# Patient Record
Sex: FEMALE | Race: BLACK OR AFRICAN AMERICAN | NOT HISPANIC OR LATINO | URBAN - METROPOLITAN AREA
[De-identification: names, ages, dates, MRNs, and addresses within clinical notes are randomized per-mention and may not be internally consistent; named-entity substitution may affect disease eponyms.]

---

## 2020-08-27 PROBLEM — R63.5 ABNORMAL WEIGHT GAIN: Status: ACTIVE | Noted: 2020-08-27

## 2020-08-27 PROBLEM — N92.6 IRREGULAR MENSES: Status: ACTIVE | Noted: 2020-08-27

## 2020-09-29 PROBLEM — E88.819 INSULIN RESISTANCE: Status: ACTIVE | Noted: 2020-09-29

## 2022-05-11 PROBLEM — D50.8 IRON DEFICIENCY ANEMIA SECONDARY TO INADEQUATE DIETARY IRON INTAKE: Status: ACTIVE | Noted: 2022-05-11

## 2022-07-26 PROBLEM — R63.5 ABNORMAL WEIGHT GAIN: Status: RESOLVED | Noted: 2020-08-27 | Resolved: 2022-07-26

## 2022-12-23 ENCOUNTER — TELEPHONE (OUTPATIENT)
Dept: INTERNAL MEDICINE | Facility: CLINIC | Age: 22
End: 2022-12-23
Payer: COMMERCIAL

## 2022-12-23 NOTE — TELEPHONE ENCOUNTER
Attempted to contact patient to remind her of upcoming nutrition appointment. No answer.  Unable to leave voicemail.

## 2023-06-19 PROBLEM — E66.01 MORBID OBESITY: Status: ACTIVE | Noted: 2023-06-19

## 2024-11-12 PROBLEM — E28.2 PCOS (POLYCYSTIC OVARIAN SYNDROME): Status: ACTIVE | Noted: 2024-11-12

## 2024-11-12 PROBLEM — E11.65 INADEQUATELY CONTROLLED DIABETES MELLITUS: Status: ACTIVE | Noted: 2024-11-12

## 2024-11-12 PROBLEM — R71.8 MICROCYTOSIS: Status: ACTIVE | Noted: 2024-11-12

## 2024-11-12 PROBLEM — R60.0 BILATERAL LEG EDEMA: Status: ACTIVE | Noted: 2024-11-12

## 2024-12-05 ENCOUNTER — CLINICAL SUPPORT (OUTPATIENT)
Dept: DIABETES | Facility: CLINIC | Age: 24
End: 2024-12-05
Payer: COMMERCIAL

## 2024-12-05 DIAGNOSIS — E11.65 INADEQUATELY CONTROLLED DIABETES MELLITUS: Primary | ICD-10-CM

## 2024-12-05 PROCEDURE — 99999 PR PBB SHADOW E&M-EST. PATIENT-LVL III: CPT | Mod: PBBFAC,,, | Performed by: DIETITIAN, REGISTERED

## 2024-12-05 PROCEDURE — G0108 DIAB MANAGE TRN  PER INDIV: HCPCS | Mod: S$GLB,,, | Performed by: DIETITIAN, REGISTERED

## 2024-12-05 NOTE — PROGRESS NOTES
Diabetes Care Specialist Progress Note  Author: Adelaida Raza RD, CDE  Date: 12/5/2024    Intake    Program Intake  Reason for Diabetes Program Visit:: Initial Diabetes Assessment  Current diabetes risk level:: moderate  In the last month, have you used the ER or been admitted to the hospital: No  Permission to speak with others about care:: no    Current Diabetes Treatment: DM Injectables  DM Injectables Type/Dose: Mounjaro 5mg    Continuous Glucose Monitoring  Patient has CGM: No    Lab Results   Component Value Date    HGBA1C 6.9 (H) 10/17/2024         Lifestyle Coping Support & Clinical    Lifestyle/Coping/Support  Does anyone in your family have diabetes or does anyone in your family support you in your diabetes care?: no family history  List anything about Diabetes that causes you stress?: meal planning  Learning Barriers:: None  Culture or Sikh beliefs that may impact ability to access healthcare: No  Psychosocial/Coping Skills Assessment Completed: : Yes  Assessment indicates:: Adequate understanding  Area of need?: No    Problem Review  Active Comorbidities: Other (comment), Obesity (PCOS)    Diabetes Self-Management Skills Assessment    Medication Skills Assessment  Patient is able to identify current diabetes medications, dosages, and appropriate timing of medications.: yes  Patient reports problems or concerns with current medication regimen.: no  Patient is  aware that some diabetes medications can cause low blood sugar?: No  Medication Skills Assessment Completed:: Yes  Assessment indicates:: Adequate understanding  Area of need?: No    Diabetes Disease Process/Treatment Options  Diabetes Type?: Type II  When were you diagnosed?: 2024  If previous diabetes education, when/where:: no  What are your goals for this education session?: meal planning  Is patient aware of what causes diabetes?: No  Does patient understand the pathophysiology of diabetes?: No  Diabetes Disease Process/Treatment  Options: Skills Assessment Completed: Yes  Assessment indicates:: Instruction Needed  Area of need?: No    Nutrition/Healthy Eating  Meal Plan 24 Hour Recall - Breakfast: poptart, fruit  Meal Plan 24 Hour Recall - Lunch: greens, grilled chicken  Meal Plan 24 Hour Recall - Dinner: none  Meal Plan 24 Hour Recall - Snack: nutrigrain bar, baked chips  Meal Plan 24 Hour Recall - Beverage: sprite, zero sugar flavored water, powerade zero  Who shops/cooks?: patient  Patient can identify foods that impact blood sugar.: yes  Challenges to healthy eating:: portion control  Nutrition/Healthy Eating Skills Assessment Completed:: Yes  Assessment indicates:: Instruction Needed, Knowledge deficit  Area of need?: Yes    Physical Activity/Exercise  Patient's daily activity level:: sedentary  Patient formally exercises outside of work.: no  Physical Activity/Exercise Skills Assessment Completed: : Yes  Assessment indicates:: Instruction Needed, Knowledge deficit  Area of need?: Yes    Home Blood Glucose Monitoring  Patient states that blood sugar is checked at home daily.: yes  Monitoring Method:: home glucometer  Fasting BG range history:: 80-95  Postmeal BG range history::   Home Blood Glucose Monitoring Skills Assessment Completed: : Yes  Assessment indicates:: Instruction Needed  Area of need?: No    Acute Complications  Have you ever had hypoglycemia (low BG 70 or less)?: no  Do you know the symptoms of low blood sugar and how to treat?: no  Have you ever had hyperglycemia (high  or more)?: no   Do you know the symptoms of high blood sugar and how to treat: no  Have you ever had DKA?: no  Do you ever test for ketones?: no  Do you have a sick day plan?: no  Acute Complications Skills Assessment Completed: : Yes  Assessment indicates:: Instruction Needed, Knowledge deficit  Area of need?: No       Assessment Summary and Plan    Based on today's diabetes care assessment, the following areas of need were identified:       Identified Areas of Need      Medication/Current Diabetes Treatment: Discussed MOA, onset, side effects, dosage of Mounjaro.    Lifestyle Coping/Support: No   Diabetes Disease Process/Treatment Options: Reviewed pathophysiology of type 2 diabetes, complications related to the disease, importance of annual dilated eye exam, and daily foot exam.   Nutrition/Healthy Eating: Yes- see care plan.    Physical Activity/Exercise: Yes- see care plan.    Home Blood Glucose Monitoring: Reviewed goals for FBG, PPBG, and HS. Educated on benefits of SMBG.    Acute Complications: Reviewed blood glucose goals, prevention, detection, signs and symptoms, and treatment of hypoglycemia and hyperglycemia, and when to contact the clinic.         Today's interventions were provided through individual discussion, instruction, and written materials were provided.      Patient verbalized understanding of instruction and written materials.  Pt was able to return back demonstration of instructions today. Patient understood key points, needs reinforcement and further instruction.     Diabetes Self-Management Care Plan:    Today's Diabetes Self-Management Care Plan was developed with Cira Castro's input. Cira Castro has agreed to work toward the following goal(s) to improve his/her overall diabetes control.      Care Plan: Diabetes Management   Updates made since 12/6/2023 12:00 AM        Problem: Healthy Eating         Goal: Eat 3 meals daily with 45-60g/3-4 servings of Carbohydrate per meal.    Start Date: 12/5/2024   Expected End Date: 6/5/2025   Priority: High   Barriers: No Barriers Identified   Note:    Patient has already reduced intake of sugar sweetened beverages.        Task: Reviewed the sources and role of Carbohydrate, Protein, and Fat and how each nutrient impacts blood sugar. Completed 12/5/2024        Task: Provided visual examples using dry measuring cups, food models, and other familiar objects such as computer mouse, deck or cards,  tennis ball etc. to help with visualization of portions. Completed 12/5/2024        Task: Recommended replacing beverages containing high sugar content with noncaloric/sugar free options and/or water. Completed 12/5/2024        Task: Review the importance of balancing carbohydrates with each meal using portion control techniques to count servings of carbohydrate and label reading to identify serving size and amount of total carbs per serving. Completed 12/5/2024        Task: Provided Sample plate method and reviewed the use of the plate to estimate amounts of carbohydrate per meal. Completed 12/5/2024        Problem: Physical Activity and Exercise         Goal: Patient agrees to increase physical activity to a goal of 1 times per week for at least 20 minutes and increase as tolerated.    Start Date: 12/5/2024   Expected End Date: 6/5/2025   Priority: Medium   Barriers: No Barriers Identified        Task: Discussed role of physical activity on reducing insulin resistance and improvement in overall glycemic control. Completed 12/5/2024        Task: Discussed role of physical activity as it relates to weight loss Completed 12/5/2024        Task: Offered suggestions on how patient could increase their regular physical activity Completed 12/5/2024          Follow Up Plan     Follow up in about 1 month (around 1/5/2025) for E11.65.    Today's care plan and follow up schedule was discussed with patient.  Cira Castro verbalized understanding of the care plan, goals, and agrees to follow up plan.        The patient was encouraged to communicate with his/her health care provider/physician and care team regarding his/her condition(s) and treatment.  I provided the patient with my contact information today and encouraged to contact me via phone or [facility] Portal as needed.     Length of Visit   Total Time: 60 Minutes

## 2025-01-07 ENCOUNTER — CLINICAL SUPPORT (OUTPATIENT)
Dept: DIABETES | Facility: CLINIC | Age: 25
End: 2025-01-07
Payer: COMMERCIAL

## 2025-01-07 DIAGNOSIS — E11.65 INADEQUATELY CONTROLLED DIABETES MELLITUS: Primary | ICD-10-CM

## 2025-01-07 PROCEDURE — 99999 PR PBB SHADOW E&M-EST. PATIENT-LVL II: CPT | Mod: PBBFAC,,, | Performed by: DIETITIAN, REGISTERED

## 2025-01-07 PROCEDURE — G0108 DIAB MANAGE TRN  PER INDIV: HCPCS | Mod: S$GLB,,, | Performed by: DIETITIAN, REGISTERED

## 2025-01-07 NOTE — PROGRESS NOTES
Diabetes Care Specialist Follow-up Note  Author: Adelaida Raza RD, CDE  Date: 1/7/2025    Intake    Program Intake  Reason for Diabetes Program Visit:: Intervention  Type of Intervention:: Individual  Individual: Education  Education: Self-Management Skill Review  Current diabetes risk level:: moderate  In the last month, have you used the ER or been admitted to the hospital: No  Permission to speak with others about care:: no    Current Diabetes Treatment: DM Injectables  DM Injectables Type/Dose: Mounjaro 5mg    Continuous Glucose Monitoring  Patient has CGM: No    Lab Results   Component Value Date    HGBA1C 6.9 (H) 10/17/2024       Physical activity/Exercise:   Walking at least 3 days per week for 20 minutes.     SMBG: Checking BID  Fasting,   Post prandial, low 100's      Diabetes Self-Management Skills Assessment    Medication Skills Assessment  Patient is able to identify current diabetes medications, dosages, and appropriate timing of medications.: yes  Patient reports problems or concerns with current medication regimen.: no  Patient is  aware that some diabetes medications can cause low blood sugar?: Yes  Medication Skills Assessment Completed:: Yes  Assessment indicates:: Adequate understanding  Area of need?: No      Nutrition/Healthy Eating  Meal Plan 24 Hour Recall - Breakfast: [place] protein bar, apple  Meal Plan 24 Hour Recall - Lunch: green salad (cucumbers and tomatoes), low fat ranch, club crackers  Meal Plan 24 Hour Recall - Dinner: green salad (cucumbers and tomatoes), low fat ranch, club crackers  Meal Plan 24 Hour Recall - Snack: fruit, raw vegetables  Meal Plan 24 Hour Recall - Beverage: 2.5 bottles of water per day or sprite zero  Who shops/cooks?: patient  Patient can identify foods that impact blood sugar.: yes  Nutrition/Healthy Eating Skills Assessment Completed:: Yes  Assessment indicates:: Other (comment) (review today)  Area of need?: Yes    Physical  Activity/Exercise  Physical Activity/Exercise Skills Assessment Completed: : Yes  Assessment indicates:: Adequate understanding  Area of need?: No    Home Blood Glucose Monitoring  Patient states that blood sugar is checked at home daily.: yes  Monitoring Method:: home glucometer  Fasting BG range history:: 90's  Postmeal BG range history:: low 100's  How often do you check your blood sugar?: twice  Home Blood Glucose Monitoring Skills Assessment Completed: : Yes  Assessment indicates:: Adequate understanding  Area of need?: No      Chronic Complications  Reviewed health maintenance: yes  Have you completed your annual diabetes maintenance labwork? : yes  Do you examine your feet daily?: yes  Has your doctor examined your feet?: yes  Do you see a Dentist?: no (making an appt with dentist)  Do you see an eye doctor?: no (scheduled in January)  Chronic Complications Skills Assessment Completed: : Yes  Assessment indicates:: Instruction Needed  Area of need?: No      During today's follow-up visit,  the following areas required further assessment and content was provided/reviewed.    Based on today's diabetes care assessment, the following areas of need were identified:      Identified Areas of Need      Medication/Current Diabetes Treatment: Taking 5mg Mounjaro weekly, no reported side effects.     Nutrition/Healthy Eating: Yes - see care plan update.    Physical Activity/Exercise: See care plan update.   Patient met goal.   Encourated patient to continue walking and gradually increase duration as tolerated.    Home Blood Glucose Monitoring: No    Chronic Complications: Discussed importance of A1c less than 7 to reduce risk of micro and macro complications, including nephropathy, neuropathy, retinopathy, heart attack and stroke. Reviewed the importance of controlled Blood Pressure and Cholesterol Lab Values in preventing disease.   Health maintenance reviewed         Today's interventions were provided through  individual discussion, instruction, and written materials were provided.    Patient verbalized understanding of instruction and written materials.  Pt was able to return back demonstration of instructions today. Patient understood key points, needs reinforcement and further instruction.     Diabetes Self-Management Care Plan Review and Evaluation of Progress:    During today's follow-up Cira Goldsteins Diabetes Self-Management Care Plan progress was reviewed and progress was evaluated including his/her input. Cira Castro has agreed to continue his/her journey to improve/maintain overall diabetes control by continuing to set health goals. See care plan progress below.      Care Plan: Diabetes Management   Updates made since 1/8/2024 12:00 AM        Problem: Healthy Eating         Goal: Eat 3 meals daily with 45-60g/3-4 servings of Carbohydrate per meal.    Start Date: 12/5/2024   Expected End Date: 6/5/2025   This Visit's Progress: On track   Priority: High   Barriers: No Barriers Identified   Note:    Patient has already reduced intake of sugar sweetened beverages.     1/7/25: Patient has completely cut out sugar sweetened beverages, has switched to Sprite Zero. Working on drinking more water throughout the day. Reports decreasing intake of fried foods. Incorporating source of protein at all meals. Will continue to focus on portion control.        Task: Reviewed the sources and role of Carbohydrate, Protein, and Fat and how each nutrient impacts blood sugar. Completed 12/5/2024        Task: Provided visual examples using dry measuring cups, food models, and other familiar objects such as computer mouse, deck or cards, tennis ball etc. to help with visualization of portions. Completed 12/5/2024        Task: Recommended replacing beverages containing high sugar content with noncaloric/sugar free options and/or water. Completed 12/5/2024        Task: Review the importance of balancing carbohydrates with each meal using portion  control techniques to count servings of carbohydrate and label reading to identify serving size and amount of total carbs per serving. Completed 12/5/2024        Task: Provided Sample plate method and reviewed the use of the plate to estimate amounts of carbohydrate per meal. Completed 12/5/2024        Problem: Physical Activity and Exercise         Goal: Patient agrees to increase physical activity to a goal of 1 times per week for at least 20 minutes and increase as tolerated. Completed 1/7/2025   Start Date: 12/5/2024   Expected End Date: 6/5/2025   This Visit's Progress: Met   Priority: Medium   Barriers: No Barriers Identified   Note:    1/7/25: walking on her days off, at least 3 days per week for 20 minutes.        Task: Discussed role of physical activity on reducing insulin resistance and improvement in overall glycemic control. Completed 12/5/2024        Task: Discussed role of physical activity as it relates to weight loss Completed 12/5/2024        Task: Offered suggestions on how patient could increase their regular physical activity Completed 12/5/2024          Follow Up Plan     Follow up in about 4 months (around 5/7/2025) for E11.65.    Today's care plan and follow up schedule was discussed with patient.  Cira Castro verbalized understanding of the care plan, goals, and agrees to follow up plan.        The patient was encouraged to communicate with his/her health care provider/physician and care team regarding his/her condition(s) and treatment.  I provided the patient with my contact information today and encouraged to contact me via phone or [facility] Patient Portal as needed.     Length of Visit   Total Time: 30 Minutes

## 2025-02-06 ENCOUNTER — OFFICE VISIT (OUTPATIENT)
Dept: OPHTHALMOLOGY | Facility: CLINIC | Age: 25
End: 2025-02-06
Payer: COMMERCIAL

## 2025-02-06 DIAGNOSIS — E11.9 DIABETES MELLITUS TYPE 2 WITHOUT RETINOPATHY: ICD-10-CM

## 2025-02-06 DIAGNOSIS — R51.9 NONINTRACTABLE HEADACHE, UNSPECIFIED CHRONICITY PATTERN, UNSPECIFIED HEADACHE TYPE: ICD-10-CM

## 2025-02-06 DIAGNOSIS — E11.65 INADEQUATELY CONTROLLED DIABETES MELLITUS: ICD-10-CM

## 2025-02-06 DIAGNOSIS — G93.2 IIH (IDIOPATHIC INTRACRANIAL HYPERTENSION): Primary | ICD-10-CM

## 2025-02-06 PROCEDURE — 1160F RVW MEDS BY RX/DR IN RCRD: CPT | Mod: CPTII,S$GLB,, | Performed by: OPHTHALMOLOGY

## 2025-02-06 PROCEDURE — 4010F ACE/ARB THERAPY RXD/TAKEN: CPT | Mod: CPTII,S$GLB,, | Performed by: OPHTHALMOLOGY

## 2025-02-06 PROCEDURE — 1159F MED LIST DOCD IN RCRD: CPT | Mod: CPTII,S$GLB,, | Performed by: OPHTHALMOLOGY

## 2025-02-06 PROCEDURE — 99203 OFFICE O/P NEW LOW 30 MIN: CPT | Mod: S$GLB,,, | Performed by: OPHTHALMOLOGY

## 2025-02-06 PROCEDURE — 99999 PR PBB SHADOW E&M-EST. PATIENT-LVL III: CPT | Mod: PBBFAC,,, | Performed by: OPHTHALMOLOGY

## 2025-02-06 RX ORDER — ACETAZOLAMIDE 500 MG/1
500 CAPSULE, EXTENDED RELEASE ORAL 2 TIMES DAILY
Qty: 60 CAPSULE | Refills: 11 | Status: CANCELLED | OUTPATIENT
Start: 2025-02-06 | End: 2026-02-06

## 2025-02-06 NOTE — H&P (VIEW-ONLY)
HPI     Diabetic Eye Exam     Additional comments: A1C:6.9 BSL:84.  Patient states vision OU has been   stable since last eye exam 2024. Patient states she was told she had   possible enlarged nerve OD but has migraines notices most when angry.   Patient is afraid of sleeping when headaches occur. Patient was told about   possible having . Has not had MRI/CT has not had any neurology           Last edited by Maynor Allen on 2/6/2025  8:27 AM.            Assessment /Plan     For exam results, see Encounter Report.    IIH (idiopathic intracranial hypertension)  Nonintractable headache, unspecified chronicity pattern, unspecified headache type  Symptoms and exam consistent with IIH. Will further evaluate with MRI/MRV brain and lumbar puncture with opening pressure. HVF 24-2 next visit.      Diabetes mellitus type 2 without retinopathy  Last A1c 6.9 . Diabetes uncontrolled with no diabetic retinopathy on dilated exam. Reviewed diabetic eye precautions including excellent blood sugar control, and importance of regular follow up.         Return to clinic in 4 weeks or sooner PRN

## 2025-02-06 NOTE — LETTER
February 6, 2025      [facility]  [address] [place] [address] [address] [address]  Phone: [phone]  Fax: [phone]       Patient: Cira Don   YOB: 2000  Date of Visit: 02/06/2025    To Whom It May Concern:    Cira Don  was at [facility] on 02/06/2025. The patient may return to work on 2/7/25 with no restrictions. If you have any questions or concerns, or if I can be of further assistance, please do not hesitate to contact me.    Sincerely,    Radha Yanez MD

## 2025-02-26 ENCOUNTER — DOCUMENTATION ONLY (OUTPATIENT)
Dept: OPHTHALMOLOGY | Facility: CLINIC | Age: 25
End: 2025-02-26
Payer: COMMERCIAL

## 2025-02-26 DIAGNOSIS — G93.2 IIH (IDIOPATHIC INTRACRANIAL HYPERTENSION): Primary | ICD-10-CM

## 2025-03-04 ENCOUNTER — HOSPITAL ENCOUNTER (OUTPATIENT)
Dept: RADIOLOGY | Facility: HOSPITAL | Age: 25
Discharge: HOME | End: 2025-03-04
Attending: OPHTHALMOLOGY
Payer: COMMERCIAL

## 2025-03-04 DIAGNOSIS — R51.9 NONINTRACTABLE HEADACHE, UNSPECIFIED CHRONICITY PATTERN, UNSPECIFIED HEADACHE TYPE: ICD-10-CM

## 2025-03-04 DIAGNOSIS — G93.2 IIH (IDIOPATHIC INTRACRANIAL HYPERTENSION): ICD-10-CM

## 2025-03-04 PROCEDURE — 25500020 PHARM REV CODE 255: Performed by: OPHTHALMOLOGY

## 2025-03-04 PROCEDURE — A9585 GADOBUTROL INJECTION: HCPCS | Performed by: OPHTHALMOLOGY

## 2025-03-04 PROCEDURE — 70553 MRI BRAIN STEM W/O & W/DYE: CPT | Mod: TC

## 2025-03-04 PROCEDURE — 70553 MRI BRAIN STEM W/O & W/DYE: CPT | Mod: 26,,, | Performed by: RADIOLOGY

## 2025-03-04 RX ORDER — GADOBUTROL 604.72 MG/ML
10 INJECTION INTRAVENOUS
Status: COMPLETED | OUTPATIENT
Start: 2025-03-04 | End: 2025-03-04

## 2025-03-04 RX ADMIN — GADOBUTROL 10 ML: 604.72 INJECTION INTRAVENOUS at 11:03

## 2025-03-06 ENCOUNTER — HOSPITAL ENCOUNTER (OUTPATIENT)
Dept: RADIOLOGY | Facility: HOSPITAL | Age: 25
Discharge: HOME | End: 2025-03-06
Attending: OPHTHALMOLOGY
Payer: COMMERCIAL

## 2025-03-06 DIAGNOSIS — R51.9 NONINTRACTABLE HEADACHE, UNSPECIFIED CHRONICITY PATTERN, UNSPECIFIED HEADACHE TYPE: ICD-10-CM

## 2025-03-06 DIAGNOSIS — G93.2 IIH (IDIOPATHIC INTRACRANIAL HYPERTENSION): ICD-10-CM

## 2025-03-06 LAB
B-HCG UR QL: NEGATIVE
CLARITY CSF: CLEAR
COLOR CSF: COLORLESS
CSF TUBE NUMBER: 1
CSF TUBE NUMBER: 1
CTP QC/QA: YES
GLUCOSE CSF-MCNC: 62 MG/DL (ref 40–70)
PROT CSF-MCNC: 15 MG/DL (ref 15–40)
RBC # CSF: 0 /CU MM
SPECIMEN VOL CSF: 5 ML
WBC # CSF: 2 /CU MM (ref 0–5)

## 2025-03-06 PROCEDURE — 89051 BODY FLUID CELL COUNT: CPT | Performed by: OPHTHALMOLOGY

## 2025-03-06 PROCEDURE — 84157 ASSAY OF PROTEIN OTHER: CPT | Performed by: OPHTHALMOLOGY

## 2025-03-06 PROCEDURE — 62328 DX LMBR SPI PNXR W/FLUOR/CT: CPT | Performed by: RADIOLOGY

## 2025-03-06 PROCEDURE — 81025 URINE PREGNANCY TEST: CPT | Performed by: OPHTHALMOLOGY

## 2025-03-06 PROCEDURE — 82945 GLUCOSE OTHER FLUID: CPT | Performed by: OPHTHALMOLOGY

## 2025-03-06 NOTE — LETTER
[facility] ([facility]  Radiology  [address] [facility] [address]  Phone: [phone]  Fax: [phone]           Patient: Cira Don   YOB: 2000   Today's Date: March 6, 2025       To Whom It May Concern:    This notice verifies that Cira Don was seen in Radiology on 3/6/2025.         Sincerely,    Titi Sabillon, RT

## 2025-03-06 NOTE — DISCHARGE SUMMARY
Radiology Post-Procedure Note    Procedure: lumbar puncture    Procedure performed by: Gina ARAUZ    Written Informed Consent Obtained: Yes    Specimen Removed: 18 mL CSF      Findings: Following written informed consent and sterile prep and drape, a 22 gauge spinal needle was inserted at L3 - L4 intralaminar space under fluoroscopic surveillance.  18 mL clear CSF removed and sent to the lab for further analysis.  There were no complications.  OP= 29 cmh20  CP=18 cmh2o  Patient tolerated procedure well.    Juvenal Fuentes PA-C

## 2025-03-13 ENCOUNTER — OFFICE VISIT (OUTPATIENT)
Dept: OPHTHALMOLOGY | Facility: CLINIC | Age: 25
End: 2025-03-13
Payer: COMMERCIAL

## 2025-03-13 DIAGNOSIS — G93.2 IIH (IDIOPATHIC INTRACRANIAL HYPERTENSION): Primary | ICD-10-CM

## 2025-03-13 PROCEDURE — 92012 INTRM OPH EXAM EST PATIENT: CPT | Mod: S$GLB,,, | Performed by: OPHTHALMOLOGY

## 2025-03-13 PROCEDURE — 1159F MED LIST DOCD IN RCRD: CPT | Mod: CPTII,S$GLB,, | Performed by: OPHTHALMOLOGY

## 2025-03-13 PROCEDURE — 99999 PR PBB SHADOW E&M-EST. PATIENT-LVL II: CPT | Mod: PBBFAC,,, | Performed by: OPHTHALMOLOGY

## 2025-03-13 PROCEDURE — 1160F RVW MEDS BY RX/DR IN RCRD: CPT | Mod: CPTII,S$GLB,, | Performed by: OPHTHALMOLOGY

## 2025-03-13 PROCEDURE — 4010F ACE/ARB THERAPY RXD/TAKEN: CPT | Mod: CPTII,S$GLB,, | Performed by: OPHTHALMOLOGY

## 2025-03-13 RX ORDER — ACETAZOLAMIDE 250 MG/1
250 TABLET ORAL 2 TIMES DAILY
Qty: 60 TABLET | Refills: 11 | Status: SHIPPED | OUTPATIENT
Start: 2025-03-13 | End: 2026-03-13

## 2025-03-13 NOTE — LETTER
March 13, 2025      [facility]  [address] [place] [address] [address] [address]  Phone: [phone]  Fax: [phone]       Patient: Cira Don   YOB: 2000  Date of Visit: 03/13/2025    To Whom It May Concern:    Cira Don  was at [facility] on 03/13/2025. The patient may return to work/school on 3/14/25 with no restrictions. If you have any questions or concerns, or if I can be of further assistance, please do not hesitate to contact me.    Sincerely,            Radha Yanez MD

## 2025-03-13 NOTE — PROGRESS NOTES
HPI     Papilledema     Additional comments: Here for f/u  for IIH.  No new complaints.           Comments    IIH  Diabetes          Last edited by Malgorzata Dominguez MA on 3/13/2025  8:07 AM.            Assessment /Plan     For exam results, see Encounter Report.    IIH (idiopathic intracranial hypertension)  Edema improved since lumbar puncture, but still present. Will begin patient on Diamox 250mg PO BID.      Return to clinic in 6 weeks or sooner PRN

## 2025-05-01 ENCOUNTER — OFFICE VISIT (OUTPATIENT)
Dept: OPHTHALMOLOGY | Facility: CLINIC | Age: 25
End: 2025-05-01
Payer: COMMERCIAL

## 2025-05-01 DIAGNOSIS — G93.2 IIH (IDIOPATHIC INTRACRANIAL HYPERTENSION): Primary | ICD-10-CM

## 2025-05-01 PROCEDURE — 92083 EXTENDED VISUAL FIELD XM: CPT | Mod: S$GLB,,, | Performed by: OPHTHALMOLOGY

## 2025-05-01 PROCEDURE — 4010F ACE/ARB THERAPY RXD/TAKEN: CPT | Mod: CPTII,S$GLB,, | Performed by: OPHTHALMOLOGY

## 2025-05-01 PROCEDURE — 99999 PR PBB SHADOW E&M-EST. PATIENT-LVL II: CPT | Mod: PBBFAC,,, | Performed by: OPHTHALMOLOGY

## 2025-05-01 PROCEDURE — 1160F RVW MEDS BY RX/DR IN RCRD: CPT | Mod: CPTII,S$GLB,, | Performed by: OPHTHALMOLOGY

## 2025-05-01 PROCEDURE — 99213 OFFICE O/P EST LOW 20 MIN: CPT | Mod: S$GLB,,, | Performed by: OPHTHALMOLOGY

## 2025-05-01 PROCEDURE — 1159F MED LIST DOCD IN RCRD: CPT | Mod: CPTII,S$GLB,, | Performed by: OPHTHALMOLOGY

## 2025-05-01 NOTE — LETTER
May 1, 2025      [facility]  [address] [place] [address] [address] [address]  Phone: [phone]  Fax: [phone]       Patient: Cira Don   YOB: 2000  Date of Visit: 05/01/2025    To Whom It May Concern:    Cira Don  was at [facility] on 05/01/2025. The patient may return to work on 5/2/25 with no restrictions. If you have any questions or concerns, or if I can be of further assistance, please do not hesitate to contact me.    Sincerely,          Radha Yanez MD

## 2025-05-01 NOTE — PROGRESS NOTES
HPI     IIH     Additional comments: Pt states vision OU has been stable since last   visit. Denies headaches and pain. Diamox 250mg PO BID. Pt states she feels   medication is working            Comments    IIH  Diabetes          Last edited by Maynor Allen on 5/1/2025 10:34 AM.            Assessment /Plan     For exam results, see Encounter Report.    IIH (idiopathic intracranial hypertension)  Edema improving. No headaches, vision stable, HVF full OU. Continue using Diamox 250mg BID PO.    Return to clinic in 6 months or sooner PRN

## 2025-05-02 ENCOUNTER — TELEPHONE (OUTPATIENT)
Dept: PHARMACY | Facility: CLINIC | Age: 25
End: 2025-05-02
Payer: COMMERCIAL

## 2025-05-02 NOTE — TELEPHONE ENCOUNTER
[facility] Chart Review & Patient Outreach Details For Medication Adherence Project    Reason for Outreach Encounter: 3rd Party payor non-compliance report ([employer], [employer], C, etc)  2.  Patient Outreach Method: Reviewed patient chart  and [employer] message  3.   Medication in question:    Diabetes Medications              tirzepatide (MOUNJARO) 7.5 mg/0.5 mL PnIj Inject 7.5 mg into the skin every 7 days. Dx e11.65                 Mounjaro  last filled  3/17/25 for 28 day supply      4.  Reviewed and or Updates Made To: Patient Chart  5. Outreach Outcomes and/or actions taken: Sent inquiry to patient: Waiting for response  Additional Notes:

## 2025-05-06 ENCOUNTER — CLINICAL SUPPORT (OUTPATIENT)
Dept: DIABETES | Facility: CLINIC | Age: 25
End: 2025-05-06
Payer: COMMERCIAL

## 2025-05-06 VITALS — BODY MASS INDEX: 67.96 KG/M2 | WEIGHT: 293 LBS

## 2025-05-06 DIAGNOSIS — E11.65 INADEQUATELY CONTROLLED DIABETES MELLITUS: Primary | ICD-10-CM

## 2025-05-06 PROCEDURE — G0108 DIAB MANAGE TRN  PER INDIV: HCPCS | Mod: S$GLB,,, | Performed by: PEDIATRICS

## 2025-05-06 PROCEDURE — 99999 PR PBB SHADOW E&M-EST. PATIENT-LVL III: CPT | Mod: PBBFAC,,,

## 2025-05-06 NOTE — LETTER
"Cira Gloria "Cira Castro" Arian was seen in our clinic on 5/6/2025.  She may return to work on 5/07/2025.      If you have any questions or concerns, please don't hesitate to call.        Lisa Gonsalves, AUREN, RN  Diabetes Care and   "

## 2025-05-06 NOTE — PROGRESS NOTES
Diabetes Care Specialist Follow-up Note  Author: Lisa Gonsalves RN  Date: 5/6/2025    Intake    Program Intake  Reason for Diabetes Program Visit:: Intervention  Type of Intervention:: Individual  Individual: Education  Education: Self-Management Skill Review  Current diabetes risk level:: moderate  In the last month, have you used the ER or been admitted to the hospital: No  Permission to speak with others about care:: no    Current Diabetes Treatment: DM Injectables  DM Injectables Type/Dose: Mounjaro 7.5 mg.    Continuous Glucose Monitoring  Patient has CGM: No    Lab Results   Component Value Date    HGBA1C 6.9 (H) 10/17/2024     A1c Pre Diabetes Care Specialist Intervention:  6.9%      Weight: (!) 179.6 kg (395 lb 15.1 oz)   Body mass index is 67.96 kg/m².      Physical activity/Exercise:   Walking or doing exercise video 3 days/week for at least 20 minutes.  Her goal is to add more days and time spent exercising.      SMBG: Finger sticks 2x/day including when she gets off of work in the AM and before she goes to work in the PM.  Fastings: 80-90s mg/dL.     Diabetes Self-Management Skills Assessment    Nutrition/Healthy Eating  Meal Plan 24 Hour Recall - Breakfast: Yogurt with granola; Water.  Meal Plan 24 Hour Recall - Lunch: Turkey sandwich on wheat with light rod and baked chips; Water  Meal Plan 24 Hour Recall - Dinner: (Ate at work): Light salad with cucumber & tomatoes; Water.  Meal Plan 24 Hour Recall - Snack: None.  Meal Plan 24 Hour Recall - Beverage: Water. Sprite Zero.    During today's follow-up visit,  the following areas required further assessment and content was provided/reviewed.    Based on today's diabetes care assessment, the following areas of need were identified:      Identified Areas of Need      Medication/Current Diabetes Treatment: On 7.5 mg of Mounjaro and tolerating well. She can tell the difference in her appetite.      Nutrition/Healthy Eating: See care plan for update.           Today's interventions were provided through individual discussion, instruction, and written materials were provided.    Patient verbalized understanding of instruction and written materials.  Pt was able to return back demonstration of instructions today. Patient understood key points, needs reinforcement and further instruction.     Diabetes Self-Management Care Plan Review and Evaluation of Progress:    During today's follow-up Cira Goldsteins Diabetes Self-Management Care Plan progress was reviewed and progress was evaluated including his/her input. Cira Castro has agreed to continue his/her journey to improve/maintain overall diabetes control by continuing to set health goals. See care plan progress below.      Care Plan: Diabetes Management   Updates made since 5/6/2024 12:00 AM        Problem: Healthy Eating         Goal: Eat 3 meals daily with 45-60g/3-4 servings of Carbohydrate per meal.    Start Date: 12/5/2024   Expected End Date: 6/5/2025   Recent Progress: On track   Priority: High   Barriers: No Barriers Identified   Note:    Patient has already reduced intake of sugar sweetened beverages.     1/7/25: Patient has completely cut out sugar sweetened beverages, has switched to Sprite Zero. Working on drinking more water throughout the day. Reports decreasing intake of fried foods. Incorporating source of protein at all meals. Will continue to focus on portion control.     5/06/25: Continues to do well with drinking water instead of sugary beverages. Has eliminated fried food.  Will trial different diets such as smoothies for 1 week, salad for another, fruit for another, balanced meals for another, etc. Educated on the importance of including protein and/or healthy fat. Reviewed some options such as greek yogurt instead of plain, nuts, cheese, peanut butter.   States she still likes her gummy candies. Educated on how to read a food label.        Task: Reviewed the sources and role of Carbohydrate, Protein, and  Fat and how each nutrient impacts blood sugar. Completed 12/5/2024        Task: Provided visual examples using dry measuring cups, food models, and other familiar objects such as computer mouse, deck or cards, tennis ball etc. to help with visualization of portions. Completed 12/5/2024        Task: Recommended replacing beverages containing high sugar content with noncaloric/sugar free options and/or water. Completed 12/5/2024        Task: Review the importance of balancing carbohydrates with each meal using portion control techniques to count servings of carbohydrate and label reading to identify serving size and amount of total carbs per serving. Completed 12/5/2024        Task: Provided Sample plate method and reviewed the use of the plate to estimate amounts of carbohydrate per meal. Completed 12/5/2024        Problem: Physical Activity and Exercise         Goal: Patient agrees to increase physical activity to a goal of 1 times per week for at least 20 minutes and increase as tolerated. Completed 1/7/2025   Start Date: 12/5/2024   Expected End Date: 6/5/2025   This Visit's Progress: Met   Priority: Medium   Barriers: No Barriers Identified   Note:    1/7/25: walking on her days off, at least 3 days per week for 20 minutes.        Task: Discussed role of physical activity on reducing insulin resistance and improvement in overall glycemic control. Completed 12/5/2024        Task: Discussed role of physical activity as it relates to weight loss Completed 12/5/2024        Task: Offered suggestions on how patient could increase their regular physical activity Completed 12/5/2024          Follow Up Plan     Follow up in about 4 months (around 9/6/2025) for review of healthy eating.    Today's care plan and follow up schedule was discussed with patient.  Cira Castro verbalized understanding of the care plan, goals, and agrees to follow up plan.        The patient was encouraged to communicate with his/her health care  provider/physician and care team regarding his/her condition(s) and treatment.  I provided the patient with my contact information today and encouraged to contact me via phone or [facility] as needed.     Length of Visit   Total Time: 40 Minutes

## 2025-06-20 ENCOUNTER — TELEPHONE (OUTPATIENT)
Dept: PHARMACY | Facility: CLINIC | Age: 25
End: 2025-06-20
Payer: COMMERCIAL

## 2025-06-20 NOTE — TELEPHONE ENCOUNTER
[facility]/Population [facility] Chart Review & Patient Outreach Details For Medication Adherence Project    Reason for Outreach Encounter: 3rd Party payor non-compliance report ([employer], [employer], C, etc)  2.  Patient Outreach Method: Reviewed Patient Chart  3.   Medication in question: mounjaro   LAST FILLED: 6/4/25 for 28 day supply  Diabetes Medications              tirzepatide (MOUNJARO) 7.5 mg/0.5 mL PnIj Inject 7.5 mg into the skin every 7 days. Dx e11.65              4.  Reviewed and or Updates Made To: Patient Chart  5. Outreach Outcomes and/or actions taken: Patient filled medication and is on track to be adherent

## 2025-07-02 ENCOUNTER — TELEPHONE (OUTPATIENT)
Dept: PHARMACY | Facility: CLINIC | Age: 25
End: 2025-07-02
Payer: COMMERCIAL

## 2025-07-03 NOTE — TELEPHONE ENCOUNTER
[facility]/Population [facility] Chart Review & Patient Outreach Details For Medication Adherence Project     1.Reason for Outreach Encounter: 3rd Party payor non-compliance report ([employer], [employer], [facility], etc)  2.  Patient Outreach Method: [employer] message  3.   Medication in question: Mounjaro 7.5mg  LAST FILLED: 6/4/25 for 28 day supply    Diabetes Medications              tirzepatide (MOUNJARO) 7.5 mg/0.5 mL PnIj Inject 7.5 mg into the skin every 7 days. Dx e11.65              4.  Reviewed and or Updates Made To: Patient Chart  5. Outreach Outcomes and/or actions taken: Sent inquiry to patient: Waiting for response.

## 2025-08-08 ENCOUNTER — TELEPHONE (OUTPATIENT)
Dept: PHARMACY | Facility: CLINIC | Age: 25
End: 2025-08-08
Payer: COMMERCIAL

## 2025-08-08 NOTE — TELEPHONE ENCOUNTER
[facility]Population [facility] Chart Review & Patient Outreach Details For Medication Adherence Project    Reason for Outreach Encounter: 3rd Party payor non-compliance report ([employer], [employer], C, etc)  2.  Patient Outreach Method: Reviewed patient chart   3.   Medication in question:    Diabetes Medications              tirzepatide (MOUNJARO) 7.5 mg/0.5 mL PnIj Inject 7.5 mg into the skin every 7 days. Dx e11.65              Hypertension Medications              hydroCHLOROthiazide (HYDRODIURIL) 25 MG tablet Take 1 tablet (25 mg total) by mouth every morning.    olmesartan (BENICAR) 5 MG Tab Take 1 tablet (5 mg total) by mouth once daily.                 Mounjaro  last filled  8/1/25 for 30 day supply  Olmesartan  last filled 6/4/25 for 90 day supply      4.  Reviewed and or Updates Made To: Patient Chart  5. Outreach Outcomes and/or actions taken: Patient filled medication and is on track to be adherent  Additional Notes: